# Patient Record
Sex: FEMALE | Race: WHITE
[De-identification: names, ages, dates, MRNs, and addresses within clinical notes are randomized per-mention and may not be internally consistent; named-entity substitution may affect disease eponyms.]

---

## 2018-03-20 ENCOUNTER — HOSPITAL ENCOUNTER (OUTPATIENT)
Dept: HOSPITAL 33 - SDC | Age: 83
Discharge: HOME | End: 2018-03-20
Attending: FAMILY MEDICINE
Payer: MEDICARE

## 2018-03-20 VITALS — DIASTOLIC BLOOD PRESSURE: 90 MMHG | HEART RATE: 66 BPM | SYSTOLIC BLOOD PRESSURE: 169 MMHG | OXYGEN SATURATION: 97 %

## 2018-03-20 DIAGNOSIS — R19.4: ICD-10-CM

## 2018-03-20 DIAGNOSIS — K57.30: Primary | ICD-10-CM

## 2018-03-20 PROCEDURE — 0DJD8ZZ INSPECTION OF LOWER INTESTINAL TRACT, VIA NATURAL OR ARTIFICIAL OPENING ENDOSCOPIC: ICD-10-PCS | Performed by: FAMILY MEDICINE

## 2018-03-20 PROCEDURE — 99100 ANES PT EXTEME AGE<1 YR&>70: CPT

## 2018-03-20 NOTE — OP
SURGERY DATE/TIME:    03/20/2018  0700



PREOPERATIVE DIAGNOSIS:    Change in bowel habits.   



POSTOPERATIVE DIAGNOSIS:    Moderate to severe sigmoid diverticulosis. 



PROCEDURE:    Colonoscopy.



SURGEON:    Dr. Clark.



ANESTHESIA:    MAC. Medications given by anesthesia department. 



HISTORY: The patient is an 84 year-old white female who reports she had change in her 
bowel habits particularly in the mornings where she would have a bowel movement and then 
have three or four thereafter. The patient reports that she cannot remember when she last 
had a colonoscopy more than ten years ago at least. The patient was felt the need to have 
endoscopic evaluation. She was appraised of the risks of the procedure including the risk 
of perforation, phlebitis, untoward reaction to medication, bleeding and missed lesions. 
The patient verbalized her understanding and desired to have the procedure performed.



DESCRIPTION OF PROCEDURE: The patient was given the medications by the anesthesia 
department. She had continuous pulse oximetry, ECG monitoring, intermittent blood pressure 
monitoring and tidal CO2 monitoring during the examination. She was placed in the left 
lateral decubitus position.  A digital rectal examination was performed and revealed 
normal anal sphincter tone and no masses. The flexible Olympus pediatric colonoscope was 
used to intubate the rectum.  A view of the colon was developed sequentially to the cecum. 
Upon insertion and withdrawal was noted moderate to severe sigmoid diverticulosis 
otherwise no mucosal lesions were encountered.  The scope was removed from the patient who 
tolerated the procedure well and was sent back to OP recovery in good condition. The prep 
was noted to be fair to good.

## 2018-08-05 ENCOUNTER — HOSPITAL ENCOUNTER (EMERGENCY)
Dept: HOSPITAL 33 - ED | Age: 83
Discharge: HOME | End: 2018-08-05
Payer: MEDICARE

## 2018-08-05 VITALS — DIASTOLIC BLOOD PRESSURE: 56 MMHG | OXYGEN SATURATION: 93 % | HEART RATE: 74 BPM | SYSTOLIC BLOOD PRESSURE: 128 MMHG

## 2018-08-05 DIAGNOSIS — E78.00: ICD-10-CM

## 2018-08-05 DIAGNOSIS — M19.90: ICD-10-CM

## 2018-08-05 DIAGNOSIS — Z79.899: ICD-10-CM

## 2018-08-05 DIAGNOSIS — M54.5: Primary | ICD-10-CM

## 2018-08-05 DIAGNOSIS — K21.9: ICD-10-CM

## 2018-08-05 DIAGNOSIS — I10: ICD-10-CM
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
## 2018-08-05 DIAGNOSIS — I25.10: ICD-10-CM

## 2018-08-05 PROCEDURE — 96372 THER/PROPH/DIAG INJ SC/IM: CPT

## 2018-08-05 PROCEDURE — 99283 EMERGENCY DEPT VISIT LOW MDM: CPT

## 2018-08-05 RX ADMIN — ORPHENADRINE CITRATE ONE MG: 60 INJECTION INTRAMUSCULAR; INTRAVENOUS at 01:44

## 2018-08-05 NOTE — ERPHSYRPT
- History of Present Illness


Time Seen by Provider: 08/05/18 01:41


Source: patient


Exam Limitations: no limitations


Patient Subjective Stated Complaint: pt states she has been having increased 

pain in her mid back tonight after cleaning her oven today.


Triage Nursing Assessment: pt alert and oriented, asnwers questions approp pt 

ambulatory with slow gait. pt short of breath after walking from waiting room 

to er room. o2 sat 95% at rest.


Physician History: 





patient presents with back pain approximately 2 hours prior to arrival to ED.  

Patient states she ha cleaning her oven and house today.  Noted she had been 

bending and stooping quite a bit.  Patient went to bed approximate 8 PM and 

awaken with bilateral lower back pain.  States pain is tight, localize an 

intermittent.  Pain is worse with movement and walking and decreases with lying 

still.  Patient denies any fever, chills,chest pain, shortness of breath, 

palpitation, dizziness, weakness, abdominal/back pain or urinary symptoms.


Timing/Duration: today, hour(s) (2 hrs PTA), intermittent, resolved prior to 

arrival


Method of Injury: bending, lifting


Quality: aching


Back Pain Location: lumbar spine


Severity of Pain-Max: mild


Severity of Pain-Current: none


Modifying Factors: Improves With: pain medication


Associated Symptoms: No fever, No chills, No loss of bowel control, No nausea, 

No vomiting, No problems urinating, No light-headedness, No dizziness, No 

numbness in legs/feet, No weakness, No sensory/motor loss


Previous symptoms: same symptoms as today


Allergies/Adverse Reactions: 








sulfamethoxazole [From Bactrim] Allergy (Intermediate, Verified 08/05/18 01:25)


 Rash


trimethoprim [From Bactrim] Allergy (Intermediate, Verified 08/05/18 01:25)


 Rash


bandaids Allergy (Intermediate, Uncoded 08/05/18 01:25)


 Rash


tape Adverse Reaction (Intermediate, Uncoded 08/05/18 01:25)


 Rash





Home Medications: 








Losartan Potassium 50 mg*** [Cozaar 50 MG***] 100 mg PO DAILY 04/21/15 [History]


Metoprolol Tartrate 25 mg*** [Lopressor 25MG Tab***] 25 mg PO BID 04/21/15 [

History]


Simvastatin [Zocor] 20 mg PO HS 04/21/15 [History]


Amlodipine Besylate 5 mg*** [Norvasc 5 mg***] 2.5 mg PO DAILY 10/17/16 [History]


Aspirin 81 mg PO DAILY 10/17/16 [History]


Calcium Carbonate/Vitamin D3 [Calcium 500 + Vit D Caplet] 1 each PO BID 10/17/

16 [History]


Lutein [Natural Lutein] 20 mg PO DAILY 10/17/16 [History]


Multivitamin with Minerals [One Daily Complete] 1 each PO DAILY 10/17/16 [

History]


Omega-3 Fatty Acids/Fish Oil** [Fish Oil 1,000 mg Capsule***] 1,000 mg PO BID 10

/17/16 [History]


Omeprazole 20 MG [Prilosec 20 mg] 20 mg PO DAILY 10/17/16 [History]


Diclofenac Sodium Gel*** [Voltaren GEL***] 2 gm TP QID 03/18/18 [History]





Hx Tetanus, Diphtheria Vaccination/Date Given: No


Hx Influenza Vaccination/Date Given: Yes


Hx Pneumococcal Vaccination/Date Given: Yes


Immunizations Up to Date: No





- Review of Systems


Constitutional: No Symptoms, No Fever, No Chills


Eyes: No Symptoms


Ears, Nose, & Throat: No Symptoms


Respiratory: No Symptoms, No Cough, No Dyspnea


Cardiac: No Symptoms, No Chest Pain, No Edema, No Syncope


Abdominal/Gastrointestinal: No Symptoms, No Abdominal Pain, No Nausea, No 

Vomiting, No Diarrhea


Genitourinary Symptoms: No Symptoms, No Dysuria


Musculoskeletal: Back Pain, No Neck Pain


Skin: No Symptoms, No Rash


Neurological: No Dizziness, No Focal Weakness, No Sensory Changes


Psychological: No Symptoms


Endocrine: No Symptoms


All Other Systems: Reviewed and Negative





- Past Medical History


Pertinent Past Medical History: Yes


Neurological History: TIA


ENT History: Cataracts


Cardiac History: Coronary Artery Disease, High Cholesterol, Hypertension


Respiratory History: No Pertinent History


Endocrine Medical History: No Pertinent History


Musculoskeletal History: Arthritis


GI Medical History: GERD


 History: No Pertinent History


Psycho-Social History: No Pertinent History


Female Reproductive Disorders: No Pertinent History, Fibroids


Other Medical History: shingles





- Past Surgical History


Past Surgical History: Yes


Neuro Surgical History: No Pertinent History


Cardiac: Cardiac Catheterization, Cardiac Stent


Respiratory: No Pertinent History


Gastrointestinal: Cholecystectomy


Genitourinary: No Pertinent History


Musculoskeletal: Joint Replacement, Other


Female Surgical History: Hysterectomy


Other Surgical History: back surgery,knee replacement, wrist surgery





- Social History


Smoking Status: Former smoker


Exposure to second hand smoke: No


Drug Use: none


Patient Lives Alone: Yes





- Nursing Vital Signs


Nursing Vital Signs: 


 Initial Vital Signs











Temperature  99.2 F   08/05/18 01:16


 


Pulse Rate  69   08/05/18 01:16


 


Respiratory Rate  18   08/05/18 01:16


 


Blood Pressure  144/60   08/05/18 01:16








 Pain Scale











Pain Intensity                 3

















- Physical Exam


General Appearance: no apparent distress, alert


Eye Exam: PERRL/EOMI, eyes nml inspection


Neck Exam: normal inspection, non-tender, supple, full range of motion, No 

meningismus, No midline tenderness


Respiratory Exam: normal breath sounds, lungs clear, No respiratory distress


Cardiovascular Exam: regular rate/rhythm, normal heart sounds


Gastrointestinal Exam: soft, No tenderness, No mass


Back Exam: decreased range of motion, muscle spasm (Para-lumbar tenderness), No 

normal range of motion


Extremity Exam: normal inspection, normal range of motion, No calf tenderness, 

No parasthesia, No pedal edema


Peripheral Pulses: dorsalis-pedis (R): 2+, dorsalis-pedis (L): 2+


Neurologic Exam: alert, oriented x 3, cooperative, CNs II-XII nml as tested, 

normal mood/affect, nml station & gait, sensation nml, No motor deficits


Skin Exam: normal color, warm, dry, No rash


**SpO2 Interpretation**: normal


SpO2: 95


Oxygen Delivery: Room Air





- Course


Nursing assessment & vital signs reviewed: Yes


Ordered Tests: 


Medication Summary














Discontinued Medications














Generic Name Dose Route Start Last Admin





  Trade Name Kennedyq  PRN Reason Stop Dose Admin


 


Orphenadrine Citrate  30 mg  08/05/18 01:40  08/05/18 01:44





  Norflex 60 Mg/2 Ml***  IM  08/05/18 01:41  30 mg





  STAT ONE   Administration





     





     





     





     


 


Orphenadrine Citrate  Confirm  08/05/18 01:42  





  Norflex 60 Mg/2 Ml***  Administered  08/05/18 01:43  





  Dose   





  60 mg   





  .ROUTE   





  .STK-MED ONE   





     





     





     





     














- Progress


Progress: improved


Progress Note: 





08/05/18 01:50


Will give pt. Norflex injection for back pain


08/05/18 02:54


States feel better and wants to go home


Counseled pt/family regarding: diagnosis





- Departure


Time of Disposition: 02:55


Departure Disposition: Home


Clinical Impression: 


 Back pain





Condition: Stable


Critical Care Time: No


Referrals: 


WILLIS THURMAN [Primary Care Provider] - 


Instructions:  Low Back Pain  (DC)


Additional Instructions: 


RX:  Flexeril


May take Motrin 600mg every 6-8 hours with food and/or Tylenol 1000mg every 4 

hours for pain


Return for worse back pain, numbness, tingling, weakness, vomiting or any 

problems


Prescriptions: 


Cyclobenzaprine HCl [Flexeril] 5 mg PO Q6-8HPRN PRN 7 Days #15 tablet


 PRN Reason: Muscle Spasms

## 2019-09-04 ENCOUNTER — HOSPITAL ENCOUNTER (OUTPATIENT)
Dept: HOSPITAL 33 - SDC-PAIN | Age: 84
Discharge: HOME | End: 2019-09-04
Attending: PSYCHIATRY & NEUROLOGY
Payer: MEDICARE

## 2019-09-04 DIAGNOSIS — I10: ICD-10-CM

## 2019-09-04 DIAGNOSIS — M47.816: Primary | ICD-10-CM

## 2019-09-04 DIAGNOSIS — Z79.899: ICD-10-CM

## 2019-09-04 DIAGNOSIS — I25.10: ICD-10-CM

## 2019-09-04 PROCEDURE — 72020 X-RAY EXAM OF SPINE 1 VIEW: CPT

## 2019-09-04 PROCEDURE — 77002 NEEDLE LOCALIZATION BY XRAY: CPT

## 2019-09-04 NOTE — XRAY
Indication: Bilateral L1-L4 MBB.



Intraoperative fluoroscopy was provided for 18 seconds.  Single digital spot

image submitted for interpretation demonstrates posterior needle tips

projecting over the expected course of the left and right L1-L4 nerve roots.

Correlate with intraoperative findings/report.

## 2019-09-25 ENCOUNTER — HOSPITAL ENCOUNTER (OUTPATIENT)
Dept: HOSPITAL 33 - SDC-PAIN | Age: 84
Discharge: HOME | End: 2019-09-25
Attending: PSYCHIATRY & NEUROLOGY
Payer: MEDICARE

## 2019-09-25 DIAGNOSIS — I25.10: ICD-10-CM

## 2019-09-25 DIAGNOSIS — M47.816: Primary | ICD-10-CM

## 2019-09-25 DIAGNOSIS — I10: ICD-10-CM

## 2019-09-25 DIAGNOSIS — Z79.899: ICD-10-CM

## 2019-09-25 PROCEDURE — 64494 INJ PARAVERT F JNT L/S 2 LEV: CPT

## 2019-09-25 PROCEDURE — 64493 INJ PARAVERT F JNT L/S 1 LEV: CPT

## 2019-09-25 PROCEDURE — 72020 X-RAY EXAM OF SPINE 1 VIEW: CPT

## 2019-09-25 PROCEDURE — 77002 NEEDLE LOCALIZATION BY XRAY: CPT

## 2019-09-25 PROCEDURE — 64495 INJ PARAVERT F JNT L/S 3 LEV: CPT

## 2019-09-26 NOTE — XRAY
Indication: Bilateral L1-L4 MBB.



Intraoperative fluoroscopy was provided for 6 seconds.  A PA digital spot film

demonstrates posterior needle tips projected over the expected course of the

left and right L-1L4 nerve roots.  Correlate with intraoperative

findings/report.

## 2019-10-16 ENCOUNTER — HOSPITAL ENCOUNTER (OUTPATIENT)
Dept: HOSPITAL 33 - SDC-PAIN | Age: 84
Discharge: HOME | End: 2019-10-16
Attending: PSYCHIATRY & NEUROLOGY
Payer: MEDICARE

## 2019-10-16 DIAGNOSIS — Z86.73: ICD-10-CM

## 2019-10-16 DIAGNOSIS — I10: ICD-10-CM

## 2019-10-16 DIAGNOSIS — I25.10: ICD-10-CM

## 2019-10-16 DIAGNOSIS — M47.816: Primary | ICD-10-CM

## 2019-10-16 DIAGNOSIS — Z79.899: ICD-10-CM

## 2019-10-16 PROCEDURE — 99100 ANES PT EXTEME AGE<1 YR&>70: CPT

## 2019-10-16 PROCEDURE — 72100 X-RAY EXAM L-S SPINE 2/3 VWS: CPT

## 2019-10-16 PROCEDURE — 64636 DESTROY L/S FACET JNT ADDL: CPT

## 2019-10-16 PROCEDURE — 77002 NEEDLE LOCALIZATION BY XRAY: CPT

## 2019-10-16 PROCEDURE — 64635 DESTROY LUMB/SAC FACET JNT: CPT

## 2019-10-16 NOTE — XRAY
Indication: Left L1-L5 RFA.



Intraoperative fluoroscopy was provided for 27 seconds.  3 digital spot images

submitted for interpretation demonstrates posterior needle tips projecting

over the expected course of the left L1-L4.  Correlate with intraoperative

findings/report.

## 2019-12-11 ENCOUNTER — HOSPITAL ENCOUNTER (OUTPATIENT)
Dept: HOSPITAL 33 - SDC-PAIN | Age: 84
Discharge: HOME | End: 2019-12-11
Attending: PSYCHIATRY & NEUROLOGY
Payer: MEDICARE

## 2019-12-11 DIAGNOSIS — M47.816: Primary | ICD-10-CM

## 2019-12-11 DIAGNOSIS — I25.10: ICD-10-CM

## 2019-12-11 DIAGNOSIS — I10: ICD-10-CM

## 2019-12-11 DIAGNOSIS — Z86.73: ICD-10-CM

## 2019-12-11 DIAGNOSIS — Z79.899: ICD-10-CM

## 2019-12-11 PROCEDURE — 77002 NEEDLE LOCALIZATION BY XRAY: CPT

## 2019-12-11 PROCEDURE — 99100 ANES PT EXTEME AGE<1 YR&>70: CPT

## 2019-12-11 PROCEDURE — 72100 X-RAY EXAM L-S SPINE 2/3 VWS: CPT

## 2019-12-11 PROCEDURE — 64636 DESTROY L/S FACET JNT ADDL: CPT

## 2019-12-11 PROCEDURE — 64635 DESTROY LUMB/SAC FACET JNT: CPT

## 2019-12-11 NOTE — XRAY
Indication: Right L1-L4 RFA.



Intraoperative fluoroscopy was provided for 24 seconds.  4 digital spot images

submitted for interpretation demonstrates posterior needle tips projecting

over the expected course of the right L1-L4 nerve roots.  Correlate with

intraoperative findings/report.